# Patient Record
Sex: MALE | Race: OTHER | HISPANIC OR LATINO | URBAN - METROPOLITAN AREA
[De-identification: names, ages, dates, MRNs, and addresses within clinical notes are randomized per-mention and may not be internally consistent; named-entity substitution may affect disease eponyms.]

---

## 2018-06-14 ENCOUNTER — EMERGENCY (EMERGENCY)
Facility: HOSPITAL | Age: 23
LOS: 1 days | Discharge: HOME | End: 2018-06-14
Attending: EMERGENCY MEDICINE
Payer: MEDICAID

## 2018-06-14 VITALS
TEMPERATURE: 98 F | OXYGEN SATURATION: 98 % | HEART RATE: 65 BPM | DIASTOLIC BLOOD PRESSURE: 75 MMHG | SYSTOLIC BLOOD PRESSURE: 120 MMHG | RESPIRATION RATE: 17 BRPM

## 2018-06-14 LAB
APPEARANCE UR: CLEAR — SIGNIFICANT CHANGE UP
BILIRUB UR-MCNC: NEGATIVE — SIGNIFICANT CHANGE UP
COLOR SPEC: YELLOW — SIGNIFICANT CHANGE UP
DIFF PNL FLD: NEGATIVE — SIGNIFICANT CHANGE UP
GLUCOSE UR QL: NEGATIVE — SIGNIFICANT CHANGE UP
KETONES UR-MCNC: ABNORMAL
LEUKOCYTE ESTERASE UR-ACNC: NEGATIVE — SIGNIFICANT CHANGE UP
NITRITE UR-MCNC: NEGATIVE — SIGNIFICANT CHANGE UP
PH UR: 6 — SIGNIFICANT CHANGE UP (ref 5–8)
PROT UR-MCNC: 30 MG/DL
RBC CASTS # UR COMP ASSIST: ABNORMAL /HPF (ref 0–2)
SP GR SPEC: >1.03 — HIGH (ref 1.01–1.02)
UROBILINOGEN FLD QL: 1 MG/DL
WBC UR QL: SIGNIFICANT CHANGE UP /HPF (ref 0–5)

## 2018-06-14 PROCEDURE — 99284 EMERGENCY DEPT VISIT MOD MDM: CPT

## 2018-06-14 PROCEDURE — 71101 X-RAY EXAM UNILAT RIBS/CHEST: CPT | Mod: 26

## 2018-06-14 RX ORDER — IBUPROFEN 200 MG
400 TABLET ORAL ONCE
Qty: 0 | Refills: 0 | Status: COMPLETED | OUTPATIENT
Start: 2018-06-14 | End: 2018-06-14

## 2018-06-14 RX ADMIN — Medication 400 MILLIGRAM(S): at 23:08

## 2018-06-14 RX ADMIN — Medication 400 MILLIGRAM(S): at 23:50

## 2018-06-14 NOTE — ED ADULT TRIAGE NOTE - CHIEF COMPLAINT QUOTE
Patient c/o right lateral back pain since 7 pm, increases with movement. Patient was working  until 6 pm (Landscaping). Patient does not recall any injury.

## 2018-06-14 NOTE — ED PROVIDER NOTE - RESPIRATORY, MLM
Breath sounds clear and equal bilaterally.  + pain on right sided inferior rib margin with deep inspiration

## 2018-06-14 NOTE — ED PROVIDER NOTE - OBJECTIVE STATEMENT
22 y.o. male coming in with right sided lower rib pain that started this evening.  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  Pain is worse with movement and palpation of the area.  No cough or SoB.  No hematuria, dysuria or chills.  No abd pain nausea, vomiting, fever or cough.  Took 2 Advil with some relief. 22 y.o. male coming in with right sided lower rib pain that started this evening at approx 1900.  Pt does not report any trauma but works as a  and has been lifting heavy objects for the last few days.  Pain is worse with movement and palpation of the area.  Worse also when he stands straight.  Not present at rest.  Nonradiating.  No cough or SoB.  No hematuria, dysuria or chills.  No testicular pain or penile d/c.  No abd pain nausea, vomiting, fever or cough.  Took 2 Advil when pain came on with some relief.  No travel, no immobilization, no recent surgeries, no car rides, no LE swelling.  No easy bleeding/bruising history.

## 2018-06-14 NOTE — ED ADULT NURSE NOTE - CHPI ED SYMPTOMS NEG
no neck tenderness/no constipation/no numbness/no bowel dysfunction/no fatigue/no bladder dysfunction/no motor function loss

## 2018-06-14 NOTE — ED PROVIDER NOTE - MUSCULOSKELETAL, MLM
No midline spinal tenderness.  Pain with flexion of the LS spine to the right side over the right inferior rib margin line.  + tender to palpation in at area as well.  No CVAT b/l. No midline spinal tenderness.  Pain is reproduced with flexion of the LS spine to the right side along the QL and paralumbar muscles  + tender to palpation in at area as well.  No signficiant spasm, no crepitus  No CVAT b/l.  Normal gait.

## 2018-06-14 NOTE — ED ADULT NURSE NOTE - OBJECTIVE STATEMENT
Pt presents for c/o back pain, midback to right side today.  He denies any injury, no rashes, no blood in urine.

## 2018-06-14 NOTE — ED PROVIDER NOTE - MEDICAL DECISION MAKING DETAILS
R sided nonradicular back pain.  Lumbar, possibly lower thoracic musculature.  Point TTP, +jump sign along paraspinal musculature on R.  No respiratory symptoms.  PERC negative.  Normal neurologic exam.  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  CT A/P if +RBCs.  Does not require blood work currently as no constitutional symptoms (f/c, LE edema, etc).  Tylenol, nsaids, reassess.  --BMM

## 2018-06-14 NOTE — ED PROVIDER NOTE - NEUROLOGICAL, MLM
Alert and oriented, no focal deficits, no motor or sensory deficits. Alert and oriented, no focal deficits, no motor or sensory deficits.  SLR negative, 5/5 strength in lower extremities b/l

## 2018-06-14 NOTE — ED PROVIDER NOTE - SHIFT CHANGE DETAILS
Received sign-out from Dr. Machuca regarding this patient awaiting only CT in anticipation of DC if unremarkable. This has since been completed and interpreted as such. Patient appears well and comfortable. Not in need of any additional emergent diagnostic investigation or intervention at this time,

## 2018-06-15 VITALS
RESPIRATION RATE: 18 BRPM | OXYGEN SATURATION: 99 % | HEART RATE: 56 BPM | SYSTOLIC BLOOD PRESSURE: 104 MMHG | DIASTOLIC BLOOD PRESSURE: 59 MMHG | TEMPERATURE: 98 F

## 2018-06-15 LAB
CULTURE RESULTS: NO GROWTH — SIGNIFICANT CHANGE UP
SPECIMEN SOURCE: SIGNIFICANT CHANGE UP

## 2018-06-15 PROCEDURE — 99284 EMERGENCY DEPT VISIT MOD MDM: CPT

## 2018-06-15 PROCEDURE — 71101 X-RAY EXAM UNILAT RIBS/CHEST: CPT

## 2018-06-15 PROCEDURE — 81001 URINALYSIS AUTO W/SCOPE: CPT

## 2018-06-15 PROCEDURE — 74176 CT ABD & PELVIS W/O CONTRAST: CPT | Mod: 26

## 2018-06-15 PROCEDURE — 87086 URINE CULTURE/COLONY COUNT: CPT

## 2018-06-15 PROCEDURE — 74176 CT ABD & PELVIS W/O CONTRAST: CPT

## 2018-06-15 NOTE — ED ADULT NURSE REASSESSMENT NOTE - NS ED NURSE REASSESS COMMENT FT1
received report from day shift nurse Rosa RN. pt is awaiting CT results for pending discharge. pt denies pain at this time. will cont to monitor. on room air with no signs of distress. comfort measures in place.

## 2020-03-13 ENCOUNTER — EMERGENCY (EMERGENCY)
Facility: HOSPITAL | Age: 25
LOS: 1 days | Discharge: HOME | End: 2020-03-13
Attending: EMERGENCY MEDICINE
Payer: SELF-PAY

## 2020-03-13 VITALS
HEART RATE: 80 BPM | OXYGEN SATURATION: 97 % | DIASTOLIC BLOOD PRESSURE: 71 MMHG | RESPIRATION RATE: 18 BRPM | TEMPERATURE: 97 F | SYSTOLIC BLOOD PRESSURE: 103 MMHG

## 2020-03-13 PROCEDURE — 99283 EMERGENCY DEPT VISIT LOW MDM: CPT

## 2020-03-14 VITALS
RESPIRATION RATE: 18 BRPM | TEMPERATURE: 98 F | HEART RATE: 78 BPM | OXYGEN SATURATION: 98 % | SYSTOLIC BLOOD PRESSURE: 114 MMHG | DIASTOLIC BLOOD PRESSURE: 80 MMHG

## 2020-03-14 LAB
FLU A RESULT: SIGNIFICANT CHANGE UP
FLU A RESULT: SIGNIFICANT CHANGE UP
FLUAV AG NPH QL: SIGNIFICANT CHANGE UP
FLUBV AG NPH QL: SIGNIFICANT CHANGE UP
RSV RESULT: SIGNIFICANT CHANGE UP
RSV RNA RESP QL NAA+PROBE: SIGNIFICANT CHANGE UP

## 2020-03-14 PROCEDURE — 87631 RESP VIRUS 3-5 TARGETS: CPT

## 2020-03-14 PROCEDURE — 99283 EMERGENCY DEPT VISIT LOW MDM: CPT

## 2020-03-14 NOTE — ED PROVIDER NOTE - OBJECTIVE STATEMENT
25yo M with no pmh/psh presents with 1d of flu-like sxs. No measured temperature. +chills. +nasal congestion, +phlegm. No covid exposure or recent travel. No SOB, CP.

## 2020-03-14 NOTE — ED PROVIDER NOTE - PHYSICAL EXAMINATION
Gen: NAD  Head: NCAT  HEENT: PERRL, MMM, normal conjunctiva, anicteric, neck supple; nasal congestion  Lung: CTAB, no adventitious sounds  CV: RRR, no murmurs, rubs or gallops  Abd: soft, NTND, no rebound or guarding, no CVAT  MSK: No edema, no visible deformities  Neuro: No focal neurologic deficits. CN II-XII grossly intact. 5/5 strength and normal sensation in all extremities.  Skin: Warm and dry, no evidence of rash  Psych: normal mood and affect

## 2020-03-14 NOTE — ED PROVIDER NOTE - NS ED ROS FT
ROS: denies HA, weakness, dizziness, nausea/vomiting, chest pain, SOB, diaphoresis, abdominal pain, diarrhea, joint pain, neuro deficits, dysuria/hematuria, rash    +nasal congestion, dry cough, chills

## 2020-03-14 NOTE — ED PROVIDER NOTE - ATTENDING CONTRIBUTION TO CARE
23yo M with no pmh/psh presents with 1d of flu-like sxs with vss in er, no sob, no exposure with covid-19, pe with injection to throat only for d.c home with supportive care.

## 2020-03-14 NOTE — ED PROVIDER NOTE - NSFOLLOWUPINSTRUCTIONS_ED_ALL_ED_FT
You were seen in the ER for nasal congestion.  Flu swab was negative.  You were seen in the ER for life threatening emergencies.  Follow up with your doctor.   You do not need to be quarantined at this time.

## 2020-03-14 NOTE — ED PROVIDER NOTE - PROGRESS NOTE DETAILS
Pt agrees with plan. Pt stable. Will DC with f/u PMD. Flu swab negative. Treating as unrelated to COVID given no exposure, mild nasal sxs.

## 2020-03-14 NOTE — ED PROVIDER NOTE - PATIENT PORTAL LINK FT
You can access the FollowMyHealth Patient Portal offered by [facility] by registering at the following website: [email or web address]. By joining [employer]’s FollowMyHealth portal, you will also be able to view your health information using other applications (apps) compatible with our system.

## 2021-02-27 NOTE — ED ADULT NURSE NOTE - NS ED NURSE RECORD ANOTHER VITAL SIGN
SCDs, HSQ for DVT prophylaxis.  Pantoprazole for GI prophylaxis.    Plan for OR Wednesday 2/24/21.   Plan to be discussed further with CT Surgery attending / team in AM rounds. Yes SCDs, HSQ for DVT prophylaxis.  Pantoprazole for GI prophylaxis.

## 2022-04-25 NOTE — ED ADULT NURSE NOTE - NSSEPSISSUSPECTED_ED_A_ED
Vera Hackett is here today for   Chief Complaint   Patient presents with   â¢ Cough     x3-4 weeks, dry cough, sore throat, congestion, runny nose. Patient has tried Benadryl and Mucinex with minimal relief. Negative Covid test 2 days ago. .  Medication Refills needed today? NO,   if you receive a prescription today would you like it to be sent to [facility]? NO    Medications: medications verified and updated    Patient would like communication of their results via:    Home Phone: [phone] (home)  Okay to leave a message containing results? Yes    Tobacco history: verified      Health Maintenance Summary     Hepatitis B Vaccine (1 of 3 - Risk 3-dose series)  Overdue - never done    Shingles Vaccine (3 of 3)  Overdue since 9/19/2020    Traditional Medicare- Medicare Wellness Visit (Yearly)  Due soon on 5/19/2022    Influenza Vaccine (Season Ended)  Next due on 9/1/2022    Depression Screening (Yearly)  Next due on 4/4/2023    Colonoscopy Risk (Every 3 Years)  Next due on 7/30/2024    DTaP/Tdap/Td Vaccine (2 - Td or Tdap)  Next due on 6/14/2027    COVID-19 Vaccine   Completed    Meningococcal Vaccine   Aged Out    HPV Vaccine   Aged Out          Patient is due for topics as listed above but is not proceeding with Immunization(s) Hep B and Shingles at this time .     COVID-19 Vaccine Interest Assessment:   â¢ Patient reported already received outside of [facility]  If the patient reports an outside immunization, please update the WIR/ICARE information in the Immunizations activity Yes

## 2025-07-08 NOTE — ED ADULT NURSE NOTE - OBJECTIVE STATEMENT
denies pain/discomfort (Rating = 0) 24y Male A&Ox3 came to ED c/o flu like symptoms. No PMH or PSH.  Pt states yesterday Pt had episode of subjective fever, chills, nasal congestion.  Pt presents with nasal congestion and phlegm.  Denies COVID exposure, recent travel, chest pain, sob, ha, n/v/d, abdominal pain, urinary symptoms, hematuria. Upon examination, full facial symmetry, no JVD or trach deviation, lung sounds clear and adequate chest rise, S1 and S2 heart sounds present, +2 pulses in all extremities with full ROM and equal strength, cap refill <2 seconds, ABD soft, non distended and non tender, pelvis intact